# Patient Record
Sex: FEMALE | Race: BLACK OR AFRICAN AMERICAN | Employment: FULL TIME | URBAN - METROPOLITAN AREA
[De-identification: names, ages, dates, MRNs, and addresses within clinical notes are randomized per-mention and may not be internally consistent; named-entity substitution may affect disease eponyms.]

---

## 2022-05-06 LAB
CHOLESTEROL, TOTAL: 162 MG/DL
CHOLESTEROL/HDL RATIO: 2.89
HDLC SERPL-MCNC: 56 MG/DL (ref 35–70)
LDL CHOLESTEROL CALCULATED: 90 MG/DL (ref 0–160)
NONHDLC SERPL-MCNC: 106 MG/DL
TRIGL SERPL-MCNC: 81 MG/DL
VLDLC SERPL CALC-MCNC: 16 MG/DL

## 2022-08-02 LAB — T4 FREE: 8.2

## 2022-08-03 LAB — TSH SERPL DL<=0.05 MIU/L-ACNC: 1.13 UIU/ML

## 2022-11-22 LAB
ALBUMIN SERPL-MCNC: 4.2 G/DL
ALP BLD-CCNC: 92 U/L
ALT SERPL-CCNC: 23 U/L
ANION GAP SERPL CALCULATED.3IONS-SCNC: 1.8 MMOL/L
AST SERPL-CCNC: 26 U/L
BASOPHILS ABSOLUTE: 0 /ΜL
BASOPHILS RELATIVE PERCENT: 0.6 %
BILIRUB SERPL-MCNC: 0.2 MG/DL (ref 0.1–1.4)
BUN BLDV-MCNC: 10 MG/DL
CALCIUM SERPL-MCNC: 9.6 MG/DL
CHLORIDE BLD-SCNC: 105 MMOL/L
CHOLESTEROL, TOTAL: 161 MG/DL
CHOLESTEROL/HDL RATIO: 2.87
CO2: 26 MMOL/L
CREAT SERPL-MCNC: 0.9 MG/DL
EGFR: 75
EOSINOPHILS ABSOLUTE: 0.3 /ΜL
EOSINOPHILS RELATIVE PERCENT: 3.7 %
GLUCOSE BLD-MCNC: 217 MG/DL
HCT VFR BLD CALC: 41 % (ref 36–46)
HDLC SERPL-MCNC: 56 MG/DL (ref 35–70)
HEMOGLOBIN: 13.6 G/DL (ref 12–16)
LDL CHOLESTEROL CALCULATED: 84 MG/DL (ref 0–160)
LYMPHOCYTES ABSOLUTE: 2.6 /ΜL
LYMPHOCYTES RELATIVE PERCENT: 36.3 %
MCH RBC QN AUTO: 29.2 PG
MCHC RBC AUTO-ENTMCNC: 33.2 G/DL
MCV RBC AUTO: 88.2 FL
MONOCYTES ABSOLUTE: 0.5 /ΜL
MONOCYTES RELATIVE PERCENT: 7.2 %
NEUTROPHILS ABSOLUTE: 3.7 /ΜL
NEUTROPHILS RELATIVE PERCENT: 52.1 %
NONHDLC SERPL-MCNC: 15 MG/DL
PLATELET # BLD: 228 K/ΜL
PMV BLD AUTO: 12.7 FL
POTASSIUM SERPL-SCNC: 4.6 MMOL/L
RBC # BLD: 4.65 10^6/ΜL
SODIUM BLD-SCNC: 145 MMOL/L
TOTAL PROTEIN: 6.6
TRIGL SERPL-MCNC: 104 MG/DL
VLDLC SERPL CALC-MCNC: 21 MG/DL
WBC # BLD: 7.1 10^3/ML

## 2023-04-27 LAB — MAMMOGRAPHY, EXTERNAL: NORMAL

## 2023-05-10 LAB — PAP SMEAR, EXTERNAL: NEGATIVE

## 2023-06-21 ENCOUNTER — OFFICE VISIT (OUTPATIENT)
Dept: FAMILY MEDICINE CLINIC | Age: 58
End: 2023-06-21
Payer: COMMERCIAL

## 2023-06-21 ENCOUNTER — TELEPHONE (OUTPATIENT)
Dept: FAMILY MEDICINE CLINIC | Age: 58
End: 2023-06-21

## 2023-06-21 VITALS
OXYGEN SATURATION: 98 % | HEIGHT: 66 IN | BODY MASS INDEX: 31.02 KG/M2 | RESPIRATION RATE: 16 BRPM | DIASTOLIC BLOOD PRESSURE: 73 MMHG | HEART RATE: 68 BPM | WEIGHT: 193 LBS | SYSTOLIC BLOOD PRESSURE: 129 MMHG

## 2023-06-21 DIAGNOSIS — E66.9 OBESITY (BMI 30.0-34.9): ICD-10-CM

## 2023-06-21 DIAGNOSIS — I10 ESSENTIAL HYPERTENSION: ICD-10-CM

## 2023-06-21 DIAGNOSIS — E11.9 TYPE 2 DIABETES MELLITUS WITHOUT COMPLICATION, WITHOUT LONG-TERM CURRENT USE OF INSULIN (HCC): Primary | ICD-10-CM

## 2023-06-21 DIAGNOSIS — Z78.9 MEDICATION INTOLERANCE: ICD-10-CM

## 2023-06-21 PROBLEM — E66.811 OBESITY (BMI 30.0-34.9): Status: ACTIVE | Noted: 2023-06-21

## 2023-06-21 LAB — HBA1C MFR BLD: 7.9 %

## 2023-06-21 PROCEDURE — 1036F TOBACCO NON-USER: CPT | Performed by: FAMILY MEDICINE

## 2023-06-21 PROCEDURE — G8427 DOCREV CUR MEDS BY ELIG CLIN: HCPCS | Performed by: FAMILY MEDICINE

## 2023-06-21 PROCEDURE — 3074F SYST BP LT 130 MM HG: CPT | Performed by: FAMILY MEDICINE

## 2023-06-21 PROCEDURE — G8417 CALC BMI ABV UP PARAM F/U: HCPCS | Performed by: FAMILY MEDICINE

## 2023-06-21 PROCEDURE — 3051F HG A1C>EQUAL 7.0%<8.0%: CPT | Performed by: FAMILY MEDICINE

## 2023-06-21 PROCEDURE — 2022F DILAT RTA XM EVC RTNOPTHY: CPT | Performed by: FAMILY MEDICINE

## 2023-06-21 PROCEDURE — 83037 HB GLYCOSYLATED A1C HOME DEV: CPT | Performed by: FAMILY MEDICINE

## 2023-06-21 PROCEDURE — 3078F DIAST BP <80 MM HG: CPT | Performed by: FAMILY MEDICINE

## 2023-06-21 PROCEDURE — 99204 OFFICE O/P NEW MOD 45 MIN: CPT | Performed by: FAMILY MEDICINE

## 2023-06-21 PROCEDURE — 3017F COLORECTAL CA SCREEN DOC REV: CPT | Performed by: FAMILY MEDICINE

## 2023-06-21 RX ORDER — ASPIRIN 81 MG/1
81 TABLET, CHEWABLE ORAL DAILY
Qty: 90 TABLET | Refills: 3 | Status: SHIPPED | OUTPATIENT
Start: 2023-06-21

## 2023-06-21 RX ORDER — IBUPROFEN 800 MG/1
800 TABLET ORAL PRN
COMMUNITY
Start: 2021-07-31

## 2023-06-21 RX ORDER — ERTUGLIFLOZIN 15 MG/1
1 TABLET, FILM COATED ORAL DAILY
COMMUNITY
Start: 2021-01-11 | End: 2023-06-21 | Stop reason: SDUPTHER

## 2023-06-21 RX ORDER — ERTUGLIFLOZIN 15 MG/1
1 TABLET, FILM COATED ORAL DAILY
Qty: 90 TABLET | Refills: 3 | Status: SHIPPED | OUTPATIENT
Start: 2023-06-21

## 2023-06-21 RX ORDER — METFORMIN HYDROCHLORIDE 500 MG/1
500 TABLET, EXTENDED RELEASE ORAL DAILY
COMMUNITY
End: 2023-06-21 | Stop reason: SDUPTHER

## 2023-06-21 RX ORDER — GLIPIZIDE 10 MG/1
10 TABLET, FILM COATED, EXTENDED RELEASE ORAL DAILY
Qty: 90 TABLET | Refills: 0 | Status: SHIPPED | OUTPATIENT
Start: 2023-06-21

## 2023-06-21 RX ORDER — GLIPIZIDE 10 MG/1
1 TABLET, FILM COATED, EXTENDED RELEASE ORAL DAILY
COMMUNITY
Start: 2023-03-20 | End: 2023-06-21 | Stop reason: CLARIF

## 2023-06-21 RX ORDER — METFORMIN HYDROCHLORIDE 500 MG/1
1500 TABLET, EXTENDED RELEASE ORAL DAILY
Qty: 270 TABLET | Refills: 3 | Status: SHIPPED | OUTPATIENT
Start: 2023-06-21

## 2023-06-21 RX ORDER — LOSARTAN POTASSIUM 25 MG/1
25 TABLET ORAL DAILY
COMMUNITY
Start: 2020-01-09

## 2023-06-21 RX ORDER — ATORVASTATIN CALCIUM 40 MG/1
40 TABLET, FILM COATED ORAL DAILY
Qty: 90 TABLET | Refills: 3 | Status: SHIPPED | OUTPATIENT
Start: 2023-06-21

## 2023-06-21 RX ORDER — OMEGA-3S/DHA/EPA/FISH OIL/D3 300MG-1000
400 CAPSULE ORAL DAILY
COMMUNITY
End: 2023-06-21 | Stop reason: ALTCHOICE

## 2023-06-21 SDOH — ECONOMIC STABILITY: FOOD INSECURITY: WITHIN THE PAST 12 MONTHS, THE FOOD YOU BOUGHT JUST DIDN'T LAST AND YOU DIDN'T HAVE MONEY TO GET MORE.: NEVER TRUE

## 2023-06-21 SDOH — ECONOMIC STABILITY: HOUSING INSECURITY
IN THE LAST 12 MONTHS, WAS THERE A TIME WHEN YOU DID NOT HAVE A STEADY PLACE TO SLEEP OR SLEPT IN A SHELTER (INCLUDING NOW)?: NO

## 2023-06-21 SDOH — ECONOMIC STABILITY: INCOME INSECURITY: HOW HARD IS IT FOR YOU TO PAY FOR THE VERY BASICS LIKE FOOD, HOUSING, MEDICAL CARE, AND HEATING?: NOT VERY HARD

## 2023-06-21 SDOH — ECONOMIC STABILITY: FOOD INSECURITY: WITHIN THE PAST 12 MONTHS, YOU WORRIED THAT YOUR FOOD WOULD RUN OUT BEFORE YOU GOT MONEY TO BUY MORE.: NEVER TRUE

## 2023-06-21 ASSESSMENT — PATIENT HEALTH QUESTIONNAIRE - PHQ9
SUM OF ALL RESPONSES TO PHQ9 QUESTIONS 1 & 2: 0
SUM OF ALL RESPONSES TO PHQ QUESTIONS 1-9: 0
2. FEELING DOWN, DEPRESSED OR HOPELESS: 0
SUM OF ALL RESPONSES TO PHQ QUESTIONS 1-9: 0
1. LITTLE INTEREST OR PLEASURE IN DOING THINGS: 0

## 2023-06-21 NOTE — TELEPHONE ENCOUNTER
Please see note below patient never rescheduled her appointment for her colonoscopy. Spoke to Leonor to get her colonoscopy rescheduled from being on hold from the [place] outbreak. She stated she was not ready yet.  She will call us to reschedule

## 2023-06-21 NOTE — TELEPHONE ENCOUNTER
----- Message from Fco Escobedo MD sent at 6/21/2023  1:55 PM EDT -----  Please search [facility] for colonosocpy --was done October 2022 I think

## 2023-06-21 NOTE — PROGRESS NOTES
complication, without long-term current use of insulin (HCC)  POCT glycosylated hemoglobin (Hb A1C)    Ertugliflozin L-PyroglutamicAc (STEGLATRO) 15 MG TABS    metFORMIN (GLUCOPHAGE-XR) 500 MG extended release tablet    glipiZIDE (GLUCOTROL XL) 10 MG extended release tablet    atorvastatin (LIPITOR) 40 MG tablet    aspirin (ASPIRIN CHILDRENS) 81 MG chewable tablet    Dulaglutide 0.75 MG/0.5ML SOPN    MICROALBUMIN / CREATININE URINE RATIO    HM DIABETES FOOT EXAM      2. Essential hypertension  losartan (COZAAR) 25 MG tablet      3. Obesity (BMI 30.0-34.9)        4. Medication intolerance                Plan:      But diabetes not controlled. Start Trulicity. Continue Steglatro, glipizide, metformin. Patient has diabetes but not on statin or aspirin. We will start now. Reduce the metformin from 4 per day to 3 per day. Don't raise the dose    Recheck in 3 months.     Time: 45 minutes      Return in about 3 months (around 9/21/2023) for DM, HTN, Fast.

## 2023-06-22 LAB
CREAT UR-MCNC: 74.9 MG/DL (ref 28–259)
MICROALBUMIN UR DL<=1MG/L-MCNC: <1.2 MG/DL
MICROALBUMIN/CREAT UR: NORMAL MG/G (ref 0–30)

## 2023-06-23 ENCOUNTER — TELEPHONE (OUTPATIENT)
Dept: FAMILY MEDICINE CLINIC | Age: 58
End: 2023-06-23

## 2023-06-23 DIAGNOSIS — E11.9 TYPE 2 DIABETES MELLITUS WITHOUT COMPLICATION, WITHOUT LONG-TERM CURRENT USE OF INSULIN (HCC): ICD-10-CM

## 2023-06-23 RX ORDER — DULAGLUTIDE 4.5 MG/.5ML
4.5 INJECTION, SOLUTION SUBCUTANEOUS WEEKLY
Qty: 4 ADJUSTABLE DOSE PRE-FILLED PEN SYRINGE | Refills: 0 | Status: SHIPPED | OUTPATIENT
Start: 2023-09-15 | End: 2023-09-19

## 2023-06-23 RX ORDER — DULAGLUTIDE 3 MG/.5ML
3 INJECTION, SOLUTION SUBCUTANEOUS WEEKLY
Qty: 4 ADJUSTABLE DOSE PRE-FILLED PEN SYRINGE | Refills: 0 | Status: SHIPPED | OUTPATIENT
Start: 2023-08-15 | End: 2023-08-19

## 2023-06-23 RX ORDER — DULAGLUTIDE 1.5 MG/.5ML
1.5 INJECTION, SOLUTION SUBCUTANEOUS WEEKLY
Qty: 2 ML | Refills: 0 | Status: SHIPPED | OUTPATIENT
Start: 2023-07-17 | End: 2023-08-08

## 2023-06-23 NOTE — TELEPHONE ENCOUNTER
Please clarify:      Monique Saldaña MD  [facility] [address] [facility]  Please check with pharmacy to see if trulicty script is

## 2023-08-01 ENCOUNTER — TELEPHONE (OUTPATIENT)
Dept: FAMILY MEDICINE CLINIC | Age: 58
End: 2023-08-01

## 2023-08-01 DIAGNOSIS — I10 ESSENTIAL HYPERTENSION: ICD-10-CM

## 2023-08-01 RX ORDER — LOSARTAN POTASSIUM 25 MG/1
25 TABLET ORAL DAILY
Qty: 90 TABLET | Refills: 0 | Status: SHIPPED | OUTPATIENT
Start: 2023-08-01

## 2023-09-21 ENCOUNTER — OFFICE VISIT (OUTPATIENT)
Dept: FAMILY MEDICINE CLINIC | Age: 58
End: 2023-09-21
Payer: COMMERCIAL

## 2023-09-21 VITALS
SYSTOLIC BLOOD PRESSURE: 133 MMHG | HEART RATE: 72 BPM | OXYGEN SATURATION: 97 % | WEIGHT: 181.4 LBS | BODY MASS INDEX: 29.28 KG/M2 | DIASTOLIC BLOOD PRESSURE: 87 MMHG

## 2023-09-21 DIAGNOSIS — E11.9 TYPE 2 DIABETES MELLITUS WITHOUT COMPLICATION, WITHOUT LONG-TERM CURRENT USE OF INSULIN (HCC): ICD-10-CM

## 2023-09-21 DIAGNOSIS — Z23 NEEDS FLU SHOT: ICD-10-CM

## 2023-09-21 DIAGNOSIS — I10 ESSENTIAL HYPERTENSION: Primary | ICD-10-CM

## 2023-09-21 DIAGNOSIS — Z28.20 VACCINE REFUSED BY PATIENT: ICD-10-CM

## 2023-09-21 DIAGNOSIS — Z11.59 NEED FOR HEPATITIS C SCREENING TEST: ICD-10-CM

## 2023-09-21 DIAGNOSIS — Z23 NEED FOR HEPATITIS B VACCINATION: ICD-10-CM

## 2023-09-21 DIAGNOSIS — Z23 NEED FOR PNEUMOCOCCAL VACCINE: ICD-10-CM

## 2023-09-21 LAB
CREAT UR-MCNC: 168.2 MG/DL (ref 28–259)
HBA1C MFR BLD: 5.6 %
MICROALBUMIN UR DL<=1MG/L-MCNC: <1.2 MG/DL
MICROALBUMIN/CREAT UR: NORMAL MG/G (ref 0–30)

## 2023-09-21 PROCEDURE — G8427 DOCREV CUR MEDS BY ELIG CLIN: HCPCS | Performed by: FAMILY MEDICINE

## 2023-09-21 PROCEDURE — 3044F HG A1C LEVEL LT 7.0%: CPT | Performed by: FAMILY MEDICINE

## 2023-09-21 PROCEDURE — 3079F DIAST BP 80-89 MM HG: CPT | Performed by: FAMILY MEDICINE

## 2023-09-21 PROCEDURE — 3075F SYST BP GE 130 - 139MM HG: CPT | Performed by: FAMILY MEDICINE

## 2023-09-21 PROCEDURE — 3017F COLORECTAL CA SCREEN DOC REV: CPT | Performed by: FAMILY MEDICINE

## 2023-09-21 PROCEDURE — G8417 CALC BMI ABV UP PARAM F/U: HCPCS | Performed by: FAMILY MEDICINE

## 2023-09-21 PROCEDURE — 90674 CCIIV4 VAC NO PRSV 0.5 ML IM: CPT | Performed by: FAMILY MEDICINE

## 2023-09-21 PROCEDURE — 99214 OFFICE O/P EST MOD 30 MIN: CPT | Performed by: FAMILY MEDICINE

## 2023-09-21 PROCEDURE — 1036F TOBACCO NON-USER: CPT | Performed by: FAMILY MEDICINE

## 2023-09-21 PROCEDURE — 83036 HEMOGLOBIN GLYCOSYLATED A1C: CPT | Performed by: FAMILY MEDICINE

## 2023-09-21 PROCEDURE — 2022F DILAT RTA XM EVC RTNOPTHY: CPT | Performed by: FAMILY MEDICINE

## 2023-09-21 PROCEDURE — 90471 IMMUNIZATION ADMIN: CPT | Performed by: FAMILY MEDICINE

## 2023-09-21 PROCEDURE — 36415 COLL VENOUS BLD VENIPUNCTURE: CPT | Performed by: FAMILY MEDICINE

## 2023-09-21 RX ORDER — IBUPROFEN 200 MG
200 TABLET ORAL EVERY 6 HOURS PRN
Qty: 60 TABLET | Refills: 0 | Status: SHIPPED | OUTPATIENT
Start: 2023-09-21

## 2023-09-21 RX ORDER — DULAGLUTIDE 1.5 MG/.5ML
1.5 INJECTION, SOLUTION SUBCUTANEOUS WEEKLY
Qty: 6 ML | Refills: 1 | Status: SHIPPED | OUTPATIENT
Start: 2023-09-21 | End: 2024-03-07

## 2023-09-21 RX ORDER — LOSARTAN POTASSIUM 25 MG/1
25 TABLET ORAL DAILY
Qty: 90 TABLET | Refills: 3 | Status: SHIPPED | OUTPATIENT
Start: 2023-09-21

## 2023-09-21 NOTE — PROGRESS NOTES
12:00 AM    BUN 10 11/22/2022 12:00 AM    CREATININE 0.9 11/22/2022 12:00 AM    GLUCOSE 217 11/22/2022 12:00 AM    CALCIUM 9.6 11/22/2022 12:00 AM     Lab Results   Component Value Date/Time    CHOL 161 11/22/2022 12:00 AM    TRIG 104 11/22/2022 12:00 AM    HDL 56 11/22/2022 12:00 AM     Hemoglobin A1C   Date Value Ref Range Status   09/21/2023 5.6 % Final   06/21/2023 7.9 % Final          Assessment:       Diagnosis Orders   1. Essential hypertension  Microalbumin / Creatinine Urine Ratio    Lipid Panel    losartan (COZAAR) 25 MG tablet      2. Type 2 diabetes mellitus without complication, without long-term current use of insulin (HCC)  Comprehensive Metabolic Panel    Vitamin B12    Microalbumin / Creatinine Urine Ratio    Lipid Panel    POCT glycosylated hemoglobin (Hb A1C)    dulaglutide (TRULICITY) 1.5 RS/9.0TN SC injection      3. Needs flu shot  Influenza, FLUCELVAX, (age 10 mo+), IM, PF, 0.5 mL      4. Need for pneumococcal vaccine        5. Need for hepatitis B vaccination        6. Need for hepatitis C screening test  Hepatitis C Antibody      7. Vaccine refused by patient                Plan:      Diabetes much improved. A1c is low. Stop the glipizide. We will also keep the Trulicity at the 1.5 mg dose. Patient doing very well. Reviewed vaccines that she is due for she is declining. Hypertension stable. Continue losartan. Check in 6 months. Return in about 25 weeks (around 3/14/2024) for DM, HTN.

## 2023-09-22 LAB
ALBUMIN SERPL-MCNC: 4.2 G/DL (ref 3.4–5)
ALBUMIN/GLOB SERPL: 1.6 {RATIO} (ref 1.1–2.2)
ALP SERPL-CCNC: 120 U/L (ref 40–129)
ALT SERPL-CCNC: 19 U/L (ref 10–40)
ANION GAP SERPL CALCULATED.3IONS-SCNC: 17 MMOL/L (ref 3–16)
AST SERPL-CCNC: 20 U/L (ref 15–37)
BILIRUB SERPL-MCNC: 0.3 MG/DL (ref 0–1)
BUN SERPL-MCNC: 13 MG/DL (ref 7–20)
CALCIUM SERPL-MCNC: 8.9 MG/DL (ref 8.3–10.6)
CHLORIDE SERPL-SCNC: 105 MMOL/L (ref 99–110)
CHOLEST SERPL-MCNC: 108 MG/DL (ref 0–199)
CO2 SERPL-SCNC: 23 MMOL/L (ref 21–32)
CREAT SERPL-MCNC: 0.8 MG/DL (ref 0.6–1.1)
GFR SERPLBLD CREATININE-BSD FMLA CKD-EPI: >60 ML/MIN/{1.73_M2}
GLUCOSE SERPL-MCNC: 85 MG/DL (ref 70–99)
HCV AB SERPL QL IA: NORMAL
HDLC SERPL-MCNC: 52 MG/DL (ref 40–60)
LDLC SERPL CALC-MCNC: 43 MG/DL
POTASSIUM SERPL-SCNC: 3.9 MMOL/L (ref 3.5–5.1)
PROT SERPL-MCNC: 6.8 G/DL (ref 6.4–8.2)
SODIUM SERPL-SCNC: 145 MMOL/L (ref 136–145)
TRIGL SERPL-MCNC: 63 MG/DL (ref 0–150)
VIT B12 SERPL-MCNC: 229 PG/ML (ref 211–911)
VLDLC SERPL CALC-MCNC: 13 MG/DL

## 2023-10-20 DIAGNOSIS — E11.9 TYPE 2 DIABETES MELLITUS WITHOUT COMPLICATION, WITHOUT LONG-TERM CURRENT USE OF INSULIN (HCC): ICD-10-CM

## 2023-10-20 RX ORDER — ERTUGLIFLOZIN 15 MG/1
1 TABLET, FILM COATED ORAL DAILY
Qty: 90 TABLET | Refills: 1 | Status: SHIPPED | OUTPATIENT
Start: 2023-10-20

## 2024-03-14 ENCOUNTER — OFFICE VISIT (OUTPATIENT)
Dept: FAMILY MEDICINE CLINIC | Age: 59
End: 2024-03-14
Payer: COMMERCIAL

## 2024-03-14 VITALS
BODY MASS INDEX: 28.05 KG/M2 | WEIGHT: 173.8 LBS | DIASTOLIC BLOOD PRESSURE: 80 MMHG | SYSTOLIC BLOOD PRESSURE: 130 MMHG | HEART RATE: 78 BPM | OXYGEN SATURATION: 100 %

## 2024-03-14 DIAGNOSIS — E11.9 TYPE 2 DIABETES MELLITUS WITHOUT COMPLICATION, WITHOUT LONG-TERM CURRENT USE OF INSULIN (HCC): Primary | ICD-10-CM

## 2024-03-14 DIAGNOSIS — L28.2 PRURITIC RASH: ICD-10-CM

## 2024-03-14 DIAGNOSIS — Z23 NEED FOR PNEUMOCOCCAL VACCINE: ICD-10-CM

## 2024-03-14 LAB — HBA1C MFR BLD: 8.2 %

## 2024-03-14 PROCEDURE — 3017F COLORECTAL CA SCREEN DOC REV: CPT | Performed by: FAMILY MEDICINE

## 2024-03-14 PROCEDURE — 3075F SYST BP GE 130 - 139MM HG: CPT | Performed by: FAMILY MEDICINE

## 2024-03-14 PROCEDURE — G8482 FLU IMMUNIZE ORDER/ADMIN: HCPCS | Performed by: FAMILY MEDICINE

## 2024-03-14 PROCEDURE — 1036F TOBACCO NON-USER: CPT | Performed by: FAMILY MEDICINE

## 2024-03-14 PROCEDURE — 99214 OFFICE O/P EST MOD 30 MIN: CPT | Performed by: FAMILY MEDICINE

## 2024-03-14 PROCEDURE — 3052F HG A1C>EQUAL 8.0%<EQUAL 9.0%: CPT | Performed by: FAMILY MEDICINE

## 2024-03-14 PROCEDURE — G8417 CALC BMI ABV UP PARAM F/U: HCPCS | Performed by: FAMILY MEDICINE

## 2024-03-14 PROCEDURE — 3079F DIAST BP 80-89 MM HG: CPT | Performed by: FAMILY MEDICINE

## 2024-03-14 PROCEDURE — 90471 IMMUNIZATION ADMIN: CPT | Performed by: FAMILY MEDICINE

## 2024-03-14 PROCEDURE — G8427 DOCREV CUR MEDS BY ELIG CLIN: HCPCS | Performed by: FAMILY MEDICINE

## 2024-03-14 PROCEDURE — 83036 HEMOGLOBIN GLYCOSYLATED A1C: CPT | Performed by: FAMILY MEDICINE

## 2024-03-14 PROCEDURE — 90677 PCV20 VACCINE IM: CPT | Performed by: FAMILY MEDICINE

## 2024-03-14 PROCEDURE — 2022F DILAT RTA XM EVC RTNOPTHY: CPT | Performed by: FAMILY MEDICINE

## 2024-03-14 RX ORDER — DULAGLUTIDE 1.5 MG/.5ML
1.5 INJECTION, SOLUTION SUBCUTANEOUS WEEKLY
Qty: 6 ML | Refills: 1 | Status: SHIPPED | OUTPATIENT
Start: 2024-03-14 | End: 2024-08-29

## 2024-03-14 RX ORDER — ASPIRIN 81 MG/1
81 TABLET, CHEWABLE ORAL DAILY
Qty: 90 TABLET | Refills: 3 | Status: SHIPPED | OUTPATIENT
Start: 2024-03-14

## 2024-03-14 RX ORDER — METFORMIN HYDROCHLORIDE 500 MG/1
1500 TABLET, EXTENDED RELEASE ORAL DAILY
Qty: 270 TABLET | Refills: 3 | Status: SHIPPED | OUTPATIENT
Start: 2024-03-14

## 2024-03-14 RX ORDER — TRIAMCINOLONE ACETONIDE 0.25 MG/G
CREAM TOPICAL 2 TIMES DAILY
Qty: 15 G | Refills: 0 | Status: SHIPPED | OUTPATIENT
Start: 2024-03-14

## 2024-03-14 RX ORDER — GLIPIZIDE 10 MG/1
10 TABLET, FILM COATED, EXTENDED RELEASE ORAL
Qty: 90 TABLET | Refills: 0 | Status: SHIPPED | OUTPATIENT
Start: 2024-03-14

## 2024-03-14 RX ORDER — ERTUGLIFLOZIN 15 MG/1
1 TABLET, FILM COATED ORAL DAILY
Qty: 90 TABLET | Refills: 1 | Status: SHIPPED | OUTPATIENT
Start: 2024-03-14

## 2024-03-14 RX ORDER — RALOXIFENE HYDROCHLORIDE 60 MG/1
1 TABLET, FILM COATED ORAL DAILY
COMMUNITY
Start: 2023-09-26

## 2024-03-14 ASSESSMENT — PATIENT HEALTH QUESTIONNAIRE - PHQ9
SUM OF ALL RESPONSES TO PHQ9 QUESTIONS 1 & 2: 0
SUM OF ALL RESPONSES TO PHQ QUESTIONS 1-9: 0
1. LITTLE INTEREST OR PLEASURE IN DOING THINGS: 0
SUM OF ALL RESPONSES TO PHQ QUESTIONS 1-9: 0
2. FEELING DOWN, DEPRESSED OR HOPELESS: 0

## 2024-03-14 NOTE — PROGRESS NOTES
(Patient not taking: Reported on 9/21/2023)      BIOTIN FORTE PO Take by mouth Otc      metFORMIN (GLUCOPHAGE-XR) 500 MG extended release tablet Take 3 tablets by mouth daily 270 tablet 3    atorvastatin (LIPITOR) 40 MG tablet Take 1 tablet by mouth daily 90 tablet 3    aspirin (ASPIRIN CHILDRENS) 81 MG chewable tablet Take 1 tablet by mouth daily 90 tablet 3     No current facility-administered medications on file prior to visit.                   Objective:       ***    Physical Exam  Vitals:    03/14/24 1123   Pulse: 78   SpO2: 100%   Weight: 78.8 kg (173 lb 12.8 oz)     Body mass index is 28.05 kg/m².     Wt Readings from Last 3 Encounters:   03/14/24 78.8 kg (173 lb 12.8 oz)   09/21/23 82.3 kg (181 lb 6.4 oz)   06/21/23 87.5 kg (193 lb)     BP Readings from Last 3 Encounters:   09/21/23 133/87   06/21/23 129/73   05/08/17 (!) 143/88          No results found for this visit on 03/14/24.        Assessment:       Diagnosis Orders   1. Type 2 diabetes mellitus without complication, without long-term current use of insulin (HCC)  POCT glycosylated hemoglobin (Hb A1C)              Plan:      ***    No follow-ups on file.       
    Hemoglobin A1C   Date Value Ref Range Status   03/14/2024 8.2 % Final   09/21/2023 5.6 % Final   06/21/2023 7.9 % Final          Assessment:       Diagnosis Orders   1. Type 2 diabetes mellitus without complication, without long-term current use of insulin (HCC)  POCT glycosylated hemoglobin (Hb A1C)    glipiZIDE (GLUCOTROL XL) 10 MG extended release tablet    Ertugliflozin L-PyroglutamicAc (STEGLATRO) 15 MG TABS    dulaglutide (TRULICITY) 1.5 MG/0.5ML SC injection    aspirin (ASPIRIN CHILDRENS) 81 MG chewable tablet    metFORMIN (GLUCOPHAGE-XR) 500 MG extended release tablet      2. Need for pneumococcal vaccine  Pneumococcal, PCV20, PREVNAR 20, (age 6w+), IM, PF      3. Pruritic rash  triamcinolone (KENALOG) 0.025 % cream              Plan:      See orders  DM worsening.      Re-start glipizide.  continue steglatro, metformin, trulicity      Return in about 3 months (around 6/12/2024) for DM, HTN.

## 2024-03-14 NOTE — PATIENT INSTRUCTIONS
NEW OFFICE HOURS: M-TH 7AM-5PM      BRING YOUR MEDICATION BOTTLES TO ALL APPOINTMENTS    Check MY CHART for test results.  If you have forgotten your password, call [phone].    The diagnoses and medications listed in this after visit summary may not be up to date.  Check MY CHART in 28-48 hours for corrections.      Late cancellation policy: So that we can better accommodate people who are sick, please give our office 24 hour notice for an appointment cancellation.      Missed appointments: Your care is very important to us.  It is important that you keep your scheduled appointments.   Multiple missed appointments will lead to a dismissal from the office.      Patients arriving late will be worked into the schedule as time permits, with patients arriving on time taken as scheduled. Late arriving patients are more than welcome to wait or reschedule their appointments.    Please allow 5-7 business days for paperwork to be processed.

## 2024-04-01 ENCOUNTER — TELEPHONE (OUTPATIENT)
Dept: FAMILY MEDICINE CLINIC | Age: 59
End: 2024-04-01

## 2024-04-01 NOTE — TELEPHONE ENCOUNTER
Patient is transferring script to [facility] pharmacy. They have the Trulicity 1.5mg

## 2024-05-03 LAB — DIABETIC RETINOPATHY: NEGATIVE

## 2024-05-19 DIAGNOSIS — E11.9 TYPE 2 DIABETES MELLITUS WITHOUT COMPLICATION, WITHOUT LONG-TERM CURRENT USE OF INSULIN (HCC): ICD-10-CM

## 2024-05-20 RX ORDER — DULAGLUTIDE 1.5 MG/.5ML
INJECTION, SOLUTION SUBCUTANEOUS
Refills: 0 | OUTPATIENT
Start: 2024-05-20

## 2024-06-12 ENCOUNTER — OFFICE VISIT (OUTPATIENT)
Dept: FAMILY MEDICINE CLINIC | Age: 59
End: 2024-06-12
Payer: COMMERCIAL

## 2024-06-12 ENCOUNTER — TELEPHONE (OUTPATIENT)
Dept: FAMILY MEDICINE CLINIC | Age: 59
End: 2024-06-12

## 2024-06-12 VITALS
DIASTOLIC BLOOD PRESSURE: 72 MMHG | SYSTOLIC BLOOD PRESSURE: 118 MMHG | HEART RATE: 70 BPM | OXYGEN SATURATION: 98 % | WEIGHT: 181 LBS | BODY MASS INDEX: 29.21 KG/M2

## 2024-06-12 DIAGNOSIS — L98.9 FOOT LESION: ICD-10-CM

## 2024-06-12 DIAGNOSIS — Z23 NEED FOR TETANUS BOOSTER: ICD-10-CM

## 2024-06-12 DIAGNOSIS — E11.9 TYPE 2 DIABETES MELLITUS WITHOUT COMPLICATION, WITHOUT LONG-TERM CURRENT USE OF INSULIN (HCC): Primary | ICD-10-CM

## 2024-06-12 DIAGNOSIS — I10 ESSENTIAL HYPERTENSION: ICD-10-CM

## 2024-06-12 LAB — HBA1C MFR BLD: 7.5 %

## 2024-06-12 PROCEDURE — 3051F HG A1C>EQUAL 7.0%<8.0%: CPT | Performed by: FAMILY MEDICINE

## 2024-06-12 PROCEDURE — 90471 IMMUNIZATION ADMIN: CPT | Performed by: FAMILY MEDICINE

## 2024-06-12 PROCEDURE — 3078F DIAST BP <80 MM HG: CPT | Performed by: FAMILY MEDICINE

## 2024-06-12 PROCEDURE — 83036 HEMOGLOBIN GLYCOSYLATED A1C: CPT | Performed by: FAMILY MEDICINE

## 2024-06-12 PROCEDURE — G8417 CALC BMI ABV UP PARAM F/U: HCPCS | Performed by: FAMILY MEDICINE

## 2024-06-12 PROCEDURE — 1036F TOBACCO NON-USER: CPT | Performed by: FAMILY MEDICINE

## 2024-06-12 PROCEDURE — G8427 DOCREV CUR MEDS BY ELIG CLIN: HCPCS | Performed by: FAMILY MEDICINE

## 2024-06-12 PROCEDURE — 99214 OFFICE O/P EST MOD 30 MIN: CPT | Performed by: FAMILY MEDICINE

## 2024-06-12 PROCEDURE — 3017F COLORECTAL CA SCREEN DOC REV: CPT | Performed by: FAMILY MEDICINE

## 2024-06-12 PROCEDURE — 90715 TDAP VACCINE 7 YRS/> IM: CPT | Performed by: FAMILY MEDICINE

## 2024-06-12 PROCEDURE — 3074F SYST BP LT 130 MM HG: CPT | Performed by: FAMILY MEDICINE

## 2024-06-12 PROCEDURE — 2022F DILAT RTA XM EVC RTNOPTHY: CPT | Performed by: FAMILY MEDICINE

## 2024-06-12 RX ORDER — DULAGLUTIDE 3 MG/.5ML
3 INJECTION, SOLUTION SUBCUTANEOUS WEEKLY
Qty: 12 ADJUSTABLE DOSE PRE-FILLED PEN SYRINGE | Refills: 3 | Status: SHIPPED | OUTPATIENT
Start: 2024-06-12 | End: 2024-06-12 | Stop reason: SDUPTHER

## 2024-06-12 RX ORDER — DULAGLUTIDE 3 MG/.5ML
3 INJECTION, SOLUTION SUBCUTANEOUS WEEKLY
Qty: 12 ADJUSTABLE DOSE PRE-FILLED PEN SYRINGE | Refills: 3 | Status: SHIPPED | OUTPATIENT
Start: 2024-06-12 | End: 2024-08-29

## 2024-06-12 RX ORDER — BLOOD SUGAR DIAGNOSTIC
1 STRIP MISCELLANEOUS 2 TIMES DAILY
Qty: 100 EACH | Refills: 11 | Status: SHIPPED | OUTPATIENT
Start: 2024-06-12

## 2024-06-12 RX ORDER — GLIPIZIDE 10 MG/1
10 TABLET, FILM COATED, EXTENDED RELEASE ORAL
Qty: 90 TABLET | Refills: 3 | Status: SHIPPED | OUTPATIENT
Start: 2024-06-12

## 2024-06-12 RX ORDER — GLUCOSAMINE HCL/CHONDROITIN SU 500-400 MG
1 CAPSULE ORAL 3 TIMES DAILY
Qty: 100 STRIP | Refills: 11 | Status: SHIPPED | OUTPATIENT
Start: 2024-06-12 | End: 2025-07-17

## 2024-06-12 RX ORDER — LANCETS 30 GAUGE
1 EACH MISCELLANEOUS 2 TIMES DAILY
Qty: 180 EACH | Refills: 3 | Status: SHIPPED | OUTPATIENT
Start: 2024-06-12 | End: 2024-09-10

## 2024-06-12 RX ORDER — DULAGLUTIDE 1.5 MG/.5ML
1.5 INJECTION, SOLUTION SUBCUTANEOUS WEEKLY
Qty: 6 ML | Refills: 1 | Status: SHIPPED | OUTPATIENT
Start: 2024-06-12 | End: 2024-06-12 | Stop reason: DRUGHIGH

## 2024-06-12 RX ORDER — BLOOD-GLUCOSE METER
1 KIT MISCELLANEOUS 2 TIMES DAILY
Qty: 1 EACH | Refills: 0 | Status: SHIPPED | OUTPATIENT
Start: 2024-06-12

## 2024-06-12 RX ORDER — FEXOFENADINE HCL 180 MG/1
180 TABLET ORAL DAILY
COMMUNITY

## 2024-06-12 RX ORDER — ATORVASTATIN CALCIUM 40 MG/1
40 TABLET, FILM COATED ORAL DAILY
Qty: 90 TABLET | Refills: 3 | Status: SHIPPED | OUTPATIENT
Start: 2024-06-12

## 2024-06-12 NOTE — PATIENT INSTRUCTIONS
NEW OFFICE HOURS: M-TH 7AM-5PM  BRING YOUR MEDICATION BOTTLES TO ALL APPOINTMENTS    Check MY CHART for test results.  If you have forgotten your password, call [phone].  The diagnoses and medications listed in this after visit summary may not be up to date.  Check MY CHART in 28-48 hours for corrections.      Late cancellation policy: So that we can better accommodate people who are sick, please give our office 24 hour notice for an appointment cancellation.    Missed appointments: Your care is very important to us.  It is important that you keep your scheduled appointments.   Multiple missed appointments will lead to a dismissal from the office.    Patients arriving late will be worked into the schedule as time permits, with patients arriving on time taken as scheduled. Late arriving patients are more than welcome to wait or reschedule their appointments.    Please allow 5-7 business days for paperwork to be processed.

## 2024-06-12 NOTE — TELEPHONE ENCOUNTER
----- Message from Nicole Willis MD sent at 6/12/2024  1:01 PM EDT -----  Please cancel the 1.5 mg Trulicity at [facility]

## 2024-06-12 NOTE — PROGRESS NOTES
Patient ID: Leonor Salvador 1965    .  Chief Complaint   Patient presents with    Diabetes     Needs meter    Hypertension    Foot Pain     Has a bump on the bottom of the right foot, a little painful, hurts to walk sometimes, noticed the bump yesterday          Diabetes  She presents for her follow-up diabetic visit. She has type 2 diabetes mellitus. There are no hypoglycemic associated symptoms. Risk factors for coronary artery disease include obesity, diabetes mellitus and post-menopausal. Current diabetic treatment includes oral agent (triple therapy). Blood glucose monitoring compliance is good. Her overall blood glucose range is 130-140 mg/dl.   Hypertension  This is a chronic problem. The current episode started more than 1 year ago. Risk factors for coronary artery disease include post-menopausal state and diabetes mellitus. Past treatments include angiotensin blockers.     Skin lesions:  bottom of right foot.  Started this week.  New one came up last night.  No lesions on her palms or other foot    Patient Active Problem List   Diagnosis    Type 2 diabetes mellitus without complication, without long-term current use of insulin ([facility])    Essential hypertension    Obesity (BMI 30.0-34.9)       Past Surgical History:   Procedure Laterality Date    BACK SURGERY      Dr. Hawkins    BREAST BIOPSY Left     x 2    KNEE CARTILAGE SURGERY Right     x 2    TONSILLECTOMY         Family History   Problem Relation Age of Onset    Diabetes Mother     Heart Failure Mother     Dementia Father     Diabetes Father     High Cholesterol Father     Diabetes Sister     Cancer Brother         Bladder    Diabetes Brother        Current Outpatient Medications on File Prior to Visit   Medication Sig Dispense Refill    fexofenadine (ALLEGRA ALLERGY) 180 MG tablet Take 1 tablet by mouth daily      raloxifene (EVISTA) 60 MG tablet Take 1 tablet by mouth daily      Ertugliflozin L-PyroglutamicAc (STEGLATRO) 15 MG TABS Take 1 tablet by

## 2024-07-03 DIAGNOSIS — E11.9 TYPE 2 DIABETES MELLITUS WITHOUT COMPLICATION, WITHOUT LONG-TERM CURRENT USE OF INSULIN (HCC): ICD-10-CM

## 2024-07-05 RX ORDER — ASPIRIN 81 MG/1
81 TABLET, CHEWABLE ORAL DAILY
Qty: 90 TABLET | Refills: 3 | OUTPATIENT
Start: 2024-07-05

## 2024-09-12 ENCOUNTER — OFFICE VISIT (OUTPATIENT)
Dept: FAMILY MEDICINE CLINIC | Age: 59
End: 2024-09-12

## 2024-09-12 VITALS
BODY MASS INDEX: 29.96 KG/M2 | WEIGHT: 179.8 LBS | HEART RATE: 79 BPM | HEIGHT: 65 IN | SYSTOLIC BLOOD PRESSURE: 114 MMHG | DIASTOLIC BLOOD PRESSURE: 70 MMHG | OXYGEN SATURATION: 100 %

## 2024-09-12 DIAGNOSIS — E11.9 TYPE 2 DIABETES MELLITUS WITHOUT COMPLICATION, WITHOUT LONG-TERM CURRENT USE OF INSULIN (HCC): Primary | ICD-10-CM

## 2024-09-12 DIAGNOSIS — Z23 NEED FOR SHINGLES VACCINE: ICD-10-CM

## 2024-09-12 DIAGNOSIS — Z23 NEEDS FLU SHOT: ICD-10-CM

## 2024-09-12 DIAGNOSIS — I10 ESSENTIAL HYPERTENSION: ICD-10-CM

## 2024-09-12 LAB — HBA1C MFR BLD: 6.6 %

## 2024-09-12 RX ORDER — DULAGLUTIDE 3 MG/.5ML
3 INJECTION, SOLUTION SUBCUTANEOUS WEEKLY
Qty: 12 ADJUSTABLE DOSE PRE-FILLED PEN SYRINGE | Refills: 3 | Status: SHIPPED | OUTPATIENT
Start: 2024-09-12

## 2024-09-12 RX ORDER — ERTUGLIFLOZIN 15 MG/1
1 TABLET, FILM COATED ORAL DAILY
Qty: 90 TABLET | Refills: 1 | Status: SHIPPED | OUTPATIENT
Start: 2024-09-12

## 2024-09-12 RX ORDER — LOSARTAN POTASSIUM 25 MG/1
25 TABLET ORAL DAILY
Qty: 90 TABLET | Refills: 3 | Status: SHIPPED | OUTPATIENT
Start: 2024-09-12

## 2024-09-12 RX ORDER — DULAGLUTIDE 3 MG/.5ML
3 INJECTION, SOLUTION SUBCUTANEOUS WEEKLY
Qty: 12 ADJUSTABLE DOSE PRE-FILLED PEN SYRINGE | Refills: 3 | Status: SHIPPED | OUTPATIENT
Start: 2024-09-12 | End: 2024-09-12 | Stop reason: SDUPTHER

## 2024-09-12 RX ORDER — LANCETS 30 GAUGE
1 EACH MISCELLANEOUS 2 TIMES DAILY
Qty: 180 EACH | Refills: 3 | Status: SHIPPED | OUTPATIENT
Start: 2024-09-12

## 2024-09-12 SDOH — ECONOMIC STABILITY: INCOME INSECURITY: HOW HARD IS IT FOR YOU TO PAY FOR THE VERY BASICS LIKE FOOD, HOUSING, MEDICAL CARE, AND HEATING?: NOT VERY HARD

## 2024-09-12 SDOH — ECONOMIC STABILITY: FOOD INSECURITY: WITHIN THE PAST 12 MONTHS, THE FOOD YOU BOUGHT JUST DIDN'T LAST AND YOU DIDN'T HAVE MONEY TO GET MORE.: NEVER TRUE

## 2024-09-12 SDOH — ECONOMIC STABILITY: FOOD INSECURITY: WITHIN THE PAST 12 MONTHS, YOU WORRIED THAT YOUR FOOD WOULD RUN OUT BEFORE YOU GOT MONEY TO BUY MORE.: NEVER TRUE

## 2024-09-13 LAB
ALBUMIN SERPL-MCNC: 4.1 G/DL (ref 3.4–5)
ALBUMIN/GLOB SERPL: 1.6 {RATIO} (ref 1.1–2.2)
ALP SERPL-CCNC: 83 U/L (ref 40–129)
ALT SERPL-CCNC: 19 U/L (ref 10–40)
ANION GAP SERPL CALCULATED.3IONS-SCNC: 11 MMOL/L (ref 3–16)
AST SERPL-CCNC: 23 U/L (ref 15–37)
BILIRUB SERPL-MCNC: <0.2 MG/DL (ref 0–1)
BUN SERPL-MCNC: 14 MG/DL (ref 7–20)
CALCIUM SERPL-MCNC: 9.4 MG/DL (ref 8.3–10.6)
CHLORIDE SERPL-SCNC: 107 MMOL/L (ref 99–110)
CHOLEST SERPL-MCNC: 99 MG/DL (ref 0–199)
CO2 SERPL-SCNC: 23 MMOL/L (ref 21–32)
CREAT SERPL-MCNC: 1 MG/DL (ref 0.6–1.1)
CREAT UR-MCNC: 209 MG/DL (ref 28–259)
GFR SERPLBLD CREATININE-BSD FMLA CKD-EPI: 65 ML/MIN/{1.73_M2}
GLUCOSE SERPL-MCNC: 93 MG/DL (ref 70–99)
HDLC SERPL-MCNC: 57 MG/DL (ref 40–60)
LDLC SERPL CALC-MCNC: 30 MG/DL
MICROALBUMIN UR DL<=1MG/L-MCNC: <1.2 MG/DL
MICROALBUMIN/CREAT UR: NORMAL MG/G (ref 0–30)
POTASSIUM SERPL-SCNC: 4.2 MMOL/L (ref 3.5–5.1)
PROT SERPL-MCNC: 6.6 G/DL (ref 6.4–8.2)
SODIUM SERPL-SCNC: 141 MMOL/L (ref 136–145)
TRIGL SERPL-MCNC: 61 MG/DL (ref 0–150)
VLDLC SERPL CALC-MCNC: 12 MG/DL

## 2024-10-07 DIAGNOSIS — E11.9 TYPE 2 DIABETES MELLITUS WITHOUT COMPLICATION, WITHOUT LONG-TERM CURRENT USE OF INSULIN (HCC): ICD-10-CM

## 2024-10-07 RX ORDER — GLIPIZIDE 10 MG/1
10 TABLET, FILM COATED, EXTENDED RELEASE ORAL
Qty: 90 TABLET | Refills: 3 | OUTPATIENT
Start: 2024-10-07

## 2024-10-08 RX ORDER — GLIPIZIDE 10 MG/1
10 TABLET, FILM COATED, EXTENDED RELEASE ORAL
Qty: 90 TABLET | Refills: 3 | OUTPATIENT
Start: 2024-10-08

## 2024-11-05 ENCOUNTER — OFFICE VISIT (OUTPATIENT)
Dept: FAMILY MEDICINE CLINIC | Age: 59
End: 2024-11-05
Payer: COMMERCIAL

## 2024-11-05 VITALS
WEIGHT: 181 LBS | HEART RATE: 85 BPM | HEIGHT: 65 IN | SYSTOLIC BLOOD PRESSURE: 132 MMHG | BODY MASS INDEX: 30.16 KG/M2 | OXYGEN SATURATION: 98 % | DIASTOLIC BLOOD PRESSURE: 80 MMHG

## 2024-11-05 DIAGNOSIS — Z23 NEED FOR SHINGLES VACCINE: ICD-10-CM

## 2024-11-05 DIAGNOSIS — Z23 NEED FOR COVID-19 VACCINE: ICD-10-CM

## 2024-11-05 DIAGNOSIS — Z00.00 ANNUAL PHYSICAL EXAM: Primary | ICD-10-CM

## 2024-11-05 PROCEDURE — 99396 PREV VISIT EST AGE 40-64: CPT | Performed by: FAMILY MEDICINE

## 2024-11-05 PROCEDURE — G8484 FLU IMMUNIZE NO ADMIN: HCPCS | Performed by: FAMILY MEDICINE

## 2024-11-05 PROCEDURE — 3075F SYST BP GE 130 - 139MM HG: CPT | Performed by: FAMILY MEDICINE

## 2024-11-05 PROCEDURE — 3079F DIAST BP 80-89 MM HG: CPT | Performed by: FAMILY MEDICINE

## 2024-11-05 NOTE — PROGRESS NOTES
History and Physical      Leonor Salvador  YOB: 1965    Date of Service:  11/5/2024    Chief Complaint:   Leonor Salvador is a 59 y.o. female who presents for complete physical examination.    Chief Complaint   Patient presents with    Annual Exam     Physical exam no concerns          HPI: Here for a physical    Hx abnormal PAP: no  How many times in the last 12 months have you had 4 or more alcoholic drinks? 0  Tobacco use: No  Exercise: occasional  Seatbelt use:Yes  Sees dentist every 6 months: yes  How many time per day do you  brush your teeth?  3  Intimate partner violence? No  Substance abuse? no    Wt Readings from Last 3 Encounters:   11/05/24 82.1 kg (181 lb)   09/12/24 81.6 kg (179 lb 12.8 oz)   06/12/24 82.1 kg (181 lb)     BP Readings from Last 3 Encounters:   11/05/24 132/80   09/12/24 114/70   06/12/24 118/72       Patient Active Problem List   Diagnosis    Type 2 diabetes mellitus without complication, without long-term current use of insulin (HCC)    Essential hypertension    Obesity (BMI 30.0-34.9)       Preventive Care:  Health Maintenance   Topic Date Due    HIV screen  Never done    Hepatitis B vaccine (1 of 3 - 19+ 3-dose series) Never done    Shingles vaccine (1 of 2) Never done    COVID-19 Vaccine (3 - 2023-24 season) 09/01/2024    Depression Screen  03/14/2025    Breast cancer screen  05/18/2025    Diabetic foot exam  09/12/2025    A1C test (Diabetic or Prediabetic)  09/12/2025    Diabetic Alb to Cr ratio (uACR) test  09/12/2025    Lipids  09/12/2025    GFR test (Diabetes, CKD 3-4, OR last GFR 15-59)  09/12/2025    Diabetic retinal exam  05/03/2026    Cervical cancer screen  05/10/2026    Colorectal Cancer Screen  08/08/2032    DTaP/Tdap/Td vaccine (2 - Td or Tdap) 06/12/2034    Flu vaccine  Completed    Pneumococcal 0-64 years Vaccine  Completed    Hepatitis C screen  Completed    Hepatitis A vaccine  Aged Out    Hib vaccine  Aged Out    Polio vaccine  Aged Out

## 2025-02-17 DIAGNOSIS — E11.9 TYPE 2 DIABETES MELLITUS WITHOUT COMPLICATION, WITHOUT LONG-TERM CURRENT USE OF INSULIN (HCC): ICD-10-CM

## 2025-02-17 RX ORDER — ATORVASTATIN CALCIUM 40 MG/1
40 TABLET, FILM COATED ORAL DAILY
Qty: 90 TABLET | Refills: 3 | OUTPATIENT
Start: 2025-02-17

## 2025-03-09 SDOH — ECONOMIC STABILITY: INCOME INSECURITY: IN THE LAST 12 MONTHS, WAS THERE A TIME WHEN YOU WERE NOT ABLE TO PAY THE MORTGAGE OR RENT ON TIME?: NO

## 2025-03-09 SDOH — ECONOMIC STABILITY: FOOD INSECURITY: WITHIN THE PAST 12 MONTHS, THE FOOD YOU BOUGHT JUST DIDN'T LAST AND YOU DIDN'T HAVE MONEY TO GET MORE.: NEVER TRUE

## 2025-03-09 SDOH — ECONOMIC STABILITY: FOOD INSECURITY: WITHIN THE PAST 12 MONTHS, YOU WORRIED THAT YOUR FOOD WOULD RUN OUT BEFORE YOU GOT MONEY TO BUY MORE.: NEVER TRUE

## 2025-03-09 SDOH — ECONOMIC STABILITY: TRANSPORTATION INSECURITY
IN THE PAST 12 MONTHS, HAS LACK OF TRANSPORTATION KEPT YOU FROM MEETINGS, WORK, OR FROM GETTING THINGS NEEDED FOR DAILY LIVING?: NO

## 2025-03-09 SDOH — ECONOMIC STABILITY: TRANSPORTATION INSECURITY
IN THE PAST 12 MONTHS, HAS THE LACK OF TRANSPORTATION KEPT YOU FROM MEDICAL APPOINTMENTS OR FROM GETTING MEDICATIONS?: NO

## 2025-03-09 ASSESSMENT — PATIENT HEALTH QUESTIONNAIRE - PHQ9
SUM OF ALL RESPONSES TO PHQ QUESTIONS 1-9: 0
2. FEELING DOWN, DEPRESSED OR HOPELESS: NOT AT ALL
SUM OF ALL RESPONSES TO PHQ QUESTIONS 1-9: 0
1. LITTLE INTEREST OR PLEASURE IN DOING THINGS: NOT AT ALL
2. FEELING DOWN, DEPRESSED OR HOPELESS: NOT AT ALL
1. LITTLE INTEREST OR PLEASURE IN DOING THINGS: NOT AT ALL
SUM OF ALL RESPONSES TO PHQ QUESTIONS 1-9: 0
SUM OF ALL RESPONSES TO PHQ QUESTIONS 1-9: 0
SUM OF ALL RESPONSES TO PHQ9 QUESTIONS 1 & 2: 0

## 2025-03-12 ENCOUNTER — OFFICE VISIT (OUTPATIENT)
Dept: FAMILY MEDICINE CLINIC | Age: 60
End: 2025-03-12
Payer: COMMERCIAL

## 2025-03-12 VITALS
SYSTOLIC BLOOD PRESSURE: 124 MMHG | WEIGHT: 180.8 LBS | OXYGEN SATURATION: 98 % | BODY MASS INDEX: 30.49 KG/M2 | DIASTOLIC BLOOD PRESSURE: 82 MMHG | HEART RATE: 65 BPM

## 2025-03-12 DIAGNOSIS — Z23 NEED FOR SHINGLES VACCINE: ICD-10-CM

## 2025-03-12 DIAGNOSIS — E11.9 TYPE 2 DIABETES MELLITUS WITHOUT COMPLICATION, WITHOUT LONG-TERM CURRENT USE OF INSULIN (HCC): Primary | ICD-10-CM

## 2025-03-12 DIAGNOSIS — Z23 NEED FOR COVID-19 VACCINE: ICD-10-CM

## 2025-03-12 LAB — HBA1C MFR BLD: 6 %

## 2025-03-12 PROCEDURE — 2022F DILAT RTA XM EVC RTNOPTHY: CPT | Performed by: FAMILY MEDICINE

## 2025-03-12 PROCEDURE — 83036 HEMOGLOBIN GLYCOSYLATED A1C: CPT | Performed by: FAMILY MEDICINE

## 2025-03-12 PROCEDURE — 3079F DIAST BP 80-89 MM HG: CPT | Performed by: FAMILY MEDICINE

## 2025-03-12 PROCEDURE — 99214 OFFICE O/P EST MOD 30 MIN: CPT | Performed by: FAMILY MEDICINE

## 2025-03-12 PROCEDURE — G8427 DOCREV CUR MEDS BY ELIG CLIN: HCPCS | Performed by: FAMILY MEDICINE

## 2025-03-12 PROCEDURE — 3017F COLORECTAL CA SCREEN DOC REV: CPT | Performed by: FAMILY MEDICINE

## 2025-03-12 PROCEDURE — 3074F SYST BP LT 130 MM HG: CPT | Performed by: FAMILY MEDICINE

## 2025-03-12 PROCEDURE — 3044F HG A1C LEVEL LT 7.0%: CPT | Performed by: FAMILY MEDICINE

## 2025-03-12 RX ORDER — GLIPIZIDE 10 MG/1
10 TABLET, FILM COATED, EXTENDED RELEASE ORAL
Qty: 90 TABLET | Refills: 1 | Status: SHIPPED | OUTPATIENT
Start: 2025-03-12

## 2025-03-12 RX ORDER — ATORVASTATIN CALCIUM 40 MG/1
40 TABLET, FILM COATED ORAL DAILY
Qty: 90 TABLET | Refills: 1 | Status: SHIPPED | OUTPATIENT
Start: 2025-03-12

## 2025-03-12 RX ORDER — METFORMIN HYDROCHLORIDE 500 MG/1
1500 TABLET, EXTENDED RELEASE ORAL DAILY
Qty: 270 TABLET | Refills: 1 | Status: SHIPPED | OUTPATIENT
Start: 2025-03-12

## 2025-03-12 RX ORDER — DAPAGLIFLOZIN 10 MG/1
10 TABLET, FILM COATED ORAL EVERY MORNING
Qty: 90 TABLET | Refills: 1 | Status: SHIPPED | OUTPATIENT
Start: 2025-03-12

## 2025-03-12 RX ORDER — ASPIRIN 81 MG/1
81 TABLET, CHEWABLE ORAL DAILY
Qty: 90 TABLET | Refills: 2 | Status: SHIPPED | OUTPATIENT
Start: 2025-03-12

## 2025-03-12 NOTE — PROGRESS NOTES
Patient ID: Leonor Salvador 1965    .  Chief Complaint   Patient presents with    Diabetes         Diabetes  She presents for her follow-up (stopped trulicity 3 months ago due to formulary coverage being denied.  admits to not calling about this.  took an extra Metformin this weekend and no diarrhea) diabetic visit. She has type 2 diabetes mellitus. There are no hypoglycemic associated symptoms. Risk factors for coronary artery disease include obesity, diabetes mellitus and post-menopausal. Current diabetic treatment includes oral agent (triple therapy). Blood glucose monitoring compliance is good. Her breakfast blood glucose range is generally 130-140 mg/dl.   Hypertension  This is a chronic problem. The current episode started more than 1 year ago. Risk factors for coronary artery disease include post-menopausal state and diabetes mellitus. Past treatments include angiotensin blockers.   Hyperlipidemia  This is a chronic problem. Current antihyperlipidemic treatment includes statins. Risk factors for coronary artery disease include post-menopausal.         Patient Active Problem List   Diagnosis    Type 2 diabetes mellitus without complication, without long-term current use of insulin ([facility])    Essential hypertension    Obesity (BMI 30.0-34.9)         Current Outpatient Medications on File Prior to Visit   Medication Sig Dispense Refill    Lancets MISC 1 each by Does not apply route 2 times daily 180 each 3    losartan (COZAAR) 25 MG tablet Take 1 tablet by mouth daily 90 tablet 3    blood glucose monitor strips 1 strip by Other route 3 times daily for 1200 doses Test 3 times a day & as needed for symptoms of irregular blood glucose. Dispense sufficient amount for indicated testing frequency plus additional to accommodate PRN testing needs. 100 strip 11    Alcohol Swabs (ALCOHOL PADS) 70 % PADS 1 Units by Does not apply route 2 times daily 100 each 11    Blood Glucose Monitoring Suppl (FREESTYLE LITE) KIMO 1

## 2025-03-25 ENCOUNTER — OFFICE VISIT (OUTPATIENT)
Dept: FAMILY MEDICINE CLINIC | Age: 60
End: 2025-03-25
Payer: COMMERCIAL

## 2025-03-25 VITALS
OXYGEN SATURATION: 99 % | SYSTOLIC BLOOD PRESSURE: 130 MMHG | DIASTOLIC BLOOD PRESSURE: 70 MMHG | WEIGHT: 187.2 LBS | HEART RATE: 74 BPM | BODY MASS INDEX: 31.57 KG/M2

## 2025-03-25 DIAGNOSIS — R29.898 WEAKNESS OF RIGHT SHOULDER: ICD-10-CM

## 2025-03-25 DIAGNOSIS — S40.021A TRAUMATIC ECCHYMOSIS OF RIGHT UPPER ARM, INITIAL ENCOUNTER: ICD-10-CM

## 2025-03-25 DIAGNOSIS — S49.91XA INJURY OF RIGHT SHOULDER, INITIAL ENCOUNTER: Primary | ICD-10-CM

## 2025-03-25 DIAGNOSIS — E11.9 TYPE 2 DIABETES MELLITUS WITHOUT COMPLICATION, WITHOUT LONG-TERM CURRENT USE OF INSULIN: ICD-10-CM
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
## 2025-03-25 PROCEDURE — 3075F SYST BP GE 130 - 139MM HG: CPT | Performed by: FAMILY MEDICINE

## 2025-03-25 PROCEDURE — 1036F TOBACCO NON-USER: CPT | Performed by: FAMILY MEDICINE

## 2025-03-25 PROCEDURE — 2022F DILAT RTA XM EVC RTNOPTHY: CPT | Performed by: FAMILY MEDICINE

## 2025-03-25 PROCEDURE — 3078F DIAST BP <80 MM HG: CPT | Performed by: FAMILY MEDICINE

## 2025-03-25 PROCEDURE — G8427 DOCREV CUR MEDS BY ELIG CLIN: HCPCS | Performed by: FAMILY MEDICINE

## 2025-03-25 PROCEDURE — 99213 OFFICE O/P EST LOW 20 MIN: CPT | Performed by: FAMILY MEDICINE

## 2025-03-25 PROCEDURE — G8417 CALC BMI ABV UP PARAM F/U: HCPCS | Performed by: FAMILY MEDICINE

## 2025-03-25 PROCEDURE — 3044F HG A1C LEVEL LT 7.0%: CPT | Performed by: FAMILY MEDICINE

## 2025-03-25 PROCEDURE — 3017F COLORECTAL CA SCREEN DOC REV: CPT | Performed by: FAMILY MEDICINE

## 2025-03-25 NOTE — PROGRESS NOTES
Patient ID: Leonor Salvador 1965    Chief Complaint   Patient presents with    Shoulder Pain     Right shoulder pain from the fall     Fall     Has a fall Sunday night, did not go to the ED        HPI     History of Present Illness  The patient presents for evaluation of right shoulder pain resulting from a fall.    Right Shoulder Pain  - The fall occurred on Sunday when her foot became entangled in a rug, causing her to lose balance and fall onto her right shoulder.  - She reports that she tried to avoid breaking her wrist by taking the brunt of the fall on her shoulder.  - The impact resulted in a bruise on her right shoulder, which she rates as a 7 on the pain scale.  - She does not believe it is fractured.  - Mobility is limited, particularly when attempting to move the arm behind her back.  - Pain management has included ibuprofen and Tylenol, taken once on Sunday evening and again on Monday.    Right Knee Injury  - Additionally, she sustained a minor rug burn on her right knee.    Weakness in Arm  - It took approximately 15 to 20 minutes to regain her footing due to immediate weakness in her arm, a symptom that persists.    Supplemental information: No head trauma or neck injury is reported. 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 She is left-handed but performs most tasks with her right hand.      Patient Active Problem List   Diagnosis    Type 2 diabetes mellitus without complication, without long-term current use of insulin (HCC)    Essential hypertension    Obesity (BMI 30.0-34.9)       Past Surgical History:   Procedure Laterality Date    BACK SURGERY      Dr. Hawkins    BREAST BIOPSY Left     x 2    KNEE CARTILAGE SURGERY Right     x 2    TONSILLECTOMY      TOTAL KNEE ARTHROPLASTY         Family History   Problem Relation Age of Onset    Diabetes Mother     Heart Failure Mother     Dementia Father     Diabetes Father     High Cholesterol Father

## 2025-04-01 ENCOUNTER — HOSPITAL ENCOUNTER (OUTPATIENT)
Dept: MRI IMAGING | Age: 60
Discharge: HOME | End: 2025-04-01
Attending: FAMILY MEDICINE
Payer: COMMERCIAL
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
## 2025-04-01 DIAGNOSIS — R29.898 WEAKNESS OF RIGHT SHOULDER: ICD-10-CM

## 2025-04-01 PROCEDURE — 73221 MRI JOINT UPR EXTREM W/O DYE: CPT

## 2025-04-02 ENCOUNTER — RESULTS FOLLOW-UP (OUTPATIENT)
Dept: FAMILY MEDICINE CLINIC | Age: 60
End: 2025-04-02

## 2025-04-02 DIAGNOSIS — S42.291A HUMERAL HEAD FRACTURE, RIGHT, CLOSED, INITIAL ENCOUNTER: Primary | ICD-10-CM

## 2025-04-02 DIAGNOSIS — S46.011A TRAUMATIC INCOMPLETE TEAR OF RIGHT ROTATOR CUFF, INITIAL ENCOUNTER: ICD-10-CM

## 2025-04-03 ENCOUNTER — OFFICE VISIT (OUTPATIENT)
Dept: ORTHOPEDIC SURGERY | Age: 60
End: 2025-04-03

## 2025-04-03 VITALS — HEIGHT: 64 IN | HEART RATE: 77 BPM | OXYGEN SATURATION: 99 % | BODY MASS INDEX: 32.13 KG/M2

## 2025-04-03 DIAGNOSIS — Z96.651 HISTORY OF TOTAL RIGHT KNEE REPLACEMENT: ICD-10-CM

## 2025-04-03 DIAGNOSIS — S80.01XA CONTUSION OF RIGHT KNEE, INITIAL ENCOUNTER: ICD-10-CM

## 2025-04-03 DIAGNOSIS — S42.294A OTHER CLOSED NONDISPLACED FRACTURE OF PROXIMAL END OF RIGHT HUMERUS, INITIAL ENCOUNTER: Primary | ICD-10-CM

## 2025-04-03 PROBLEM — S42.201A CLOSED FRACTURE OF RIGHT PROXIMAL HUMERUS: Status: ACTIVE | Noted: 2025-04-03

## 2025-04-03 SDOH — HEALTH STABILITY: PHYSICAL HEALTH: ON AVERAGE, HOW MANY MINUTES DO YOU ENGAGE IN EXERCISE AT THIS LEVEL?: 0 MIN

## 2025-04-03 SDOH — HEALTH STABILITY: PHYSICAL HEALTH: ON AVERAGE, HOW MANY DAYS PER WEEK DO YOU ENGAGE IN MODERATE TO STRENUOUS EXERCISE (LIKE A BRISK WALK)?: 0 DAYS

## 2025-04-03 NOTE — PROGRESS NOTES
IMPRESSION:      1.  Comminuted nondisplaced fracture right humeral head.     2.  Small partial tear at the 4 (supraspinatus tendon with adjacent   tendinopathy. No evidence for full-thickness rotator cuff tear      Pt states she fell over her boot. Pt states her pain has decreased since her fall. Pt states she does not wear her sling while at work and does well. Pt states she still cannot fully lift her arm he way she wants too and the pain is uncomfortable. Pt states she is currently taking OTC pain medication to help.

## 2025-04-03 NOTE — PROGRESS NOTES
4/3/2025   Chief Complaint   Patient presents with    Injury     Comminuted nondisplaced fracture right humeral head        History of Present Illness:                             Leonor Salvador is a 59 y.o. female who presents today for evaluation of her right shoulder pain.  She had a mechanical fall falling forcefully onto her right shoulder about 1 week ago.  She was seen by her primary care provider who treated her appropriately with a sling and imaging.  She was then referred here for further evaluation and discussion of treatment options for her fracture that was found.    Patient has noticed gradual improvement in her overall pain level with rest and immobilization in her sling.  She feels that her shoulder is doing reasonably well with no ongoing instability issues or severe problems at the shoulder.      IMPRESSION:      1.  Comminuted nondisplaced fracture right humeral head.     2.  Small partial tear at the 4 (supraspinatus tendon with adjacent   tendinopathy. No evidence for full-thickness rotator cuff tear      Medical History  Patient's medications, allergies, past medical, surgical, social and family histories were reviewed and updated as appropriate.    Past Medical History:   Diagnosis Date    Diabetes mellitus (HCC)     Sciatic pain      Past Surgical History:   Procedure Laterality Date    BACK SURGERY      Dr. Hawkins    BREAST BIOPSY Left     x 2    KNEE CARTILAGE SURGERY Right     x 2    TONSILLECTOMY      TOTAL KNEE ARTHROPLASTY       Family History   Problem Relation Age of Onset    Diabetes Mother     Heart Failure Mother     Dementia Father     Diabetes Father     High Cholesterol Father     Diabetes Sister     Cancer Brother         Bladder    Diabetes Brother      Social History     Socioeconomic History    Marital status: Single   Occupational History    Occupation:  at [employer]   Tobacco Use    Smoking status: Never     Passive exposure: Never    Smokeless

## 2025-04-06 DIAGNOSIS — E11.9 TYPE 2 DIABETES MELLITUS WITHOUT COMPLICATION, WITHOUT LONG-TERM CURRENT USE OF INSULIN (HCC): ICD-10-CM

## 2025-04-07 RX ORDER — ASPIRIN 81 MG/1
81 TABLET, CHEWABLE ORAL
Qty: 90 TABLET | Refills: 2 | OUTPATIENT
Start: 2025-04-07

## 2025-04-07 RX ORDER — METFORMIN HYDROCHLORIDE 500 MG/1
1500 TABLET, EXTENDED RELEASE ORAL DAILY
Qty: 270 TABLET | Refills: 1 | OUTPATIENT
Start: 2025-04-07

## 2025-04-24 ENCOUNTER — OFFICE VISIT (OUTPATIENT)
Dept: ORTHOPEDIC SURGERY | Age: 60
End: 2025-04-24

## 2025-04-24 VITALS — OXYGEN SATURATION: 98 % | HEIGHT: 64 IN | BODY MASS INDEX: 32.13 KG/M2 | HEART RATE: 70 BPM

## 2025-04-24 DIAGNOSIS — S42.294D OTHER CLOSED NONDISPLACED FRACTURE OF PROXIMAL END OF RIGHT HUMERUS WITH ROUTINE HEALING, SUBSEQUENT ENCOUNTER: Primary | ICD-10-CM

## 2025-04-24 PROCEDURE — 99024 POSTOP FOLLOW-UP VISIT: CPT | Performed by: ORTHOPAEDIC SURGERY

## 2025-04-24 NOTE — PATIENT INSTRUCTIONS
Continue weight-bearing as tolerated.  Continue range of motion exercises as instructed.  Ice and elevate as needed.  Tylenol or Motrin for pain.  Follow up in 1 month

## 2025-04-24 NOTE — PROGRESS NOTES
Pt returns to the office for follow up  on closed nondisplaced fracture of proximal end of right humerus.  Pt states she is having very minimal pain. Pt states she has an occasional ache in her shoulder with certain movements but it has made improvements. Pt states the pain in her right shoulder does increase when she does anything overhead. Pt states she has no new concerns at this time.

## 2025-04-24 NOTE — PROGRESS NOTES
4/24/2025   Chief Complaint   Patient presents with    Follow-up    Injury     Communited nondisplaced fracture right humeral head        History of Present Illness:                             Leonor Salvador is a 59 y.o. female who returns today for follow-up of her right proximal humerus fracture.  She has been doing well advancing her light activities and gentle range of motion.  She feels her shoulder is coming along well and denies any setbacks or problems.  She still has some mild aching pain at the healing fracture site of the proximal humerus.    Pt returns to the office for follow up  on closed nondisplaced fracture of proximal end of right humerus.  Pt states she is having very minimal pain. Pt states she has an occasional ache in her shoulder with certain movements but it has made improvements. Pt states the pain in her right shoulder does increase when she does anything overhead. Pt states she has no new concerns at this time.       Medical History  Patient's medications, allergies, past medical, surgical, social and family histories were reviewed and updated as appropriate.      Review of Systems                                            Examination:  General Exam:  Vitals: Pulse 70   Ht 1.626 m (5' 4\")   SpO2 98%   BMI 32.13 kg/m²    Physical Exam     Right upper extremity:  Improved minimal tenderness to palpation at the infection of the proximal humerus.  Normal limit of the shoulder.  Painless active range of motion and she is able to forward elevate up to 120 and abduct past to 100 degrees and internally and externally rotate with minimal discomfort.  Rotator cuff is functioning well.  Sensation motor functions intact throughout.    Diagnostic testing:  X-rays reviewed in office, I independently reviewed the films in the office today:     XR SHOULDER RIGHT (MIN 2 VIEWS)  Result Date: 4/24/2025  3 views of the right shoulder and proximal humerus show evidence of stable alignment of the

## 2025-05-20 DIAGNOSIS — Z09 FRACTURE FOLLOW-UP: Primary | ICD-10-CM

## 2025-05-21 ENCOUNTER — HOSPITAL ENCOUNTER (OUTPATIENT)
Dept: GENERAL RADIOLOGY | Age: 60
Discharge: HOME | End: 2025-05-21
Payer: COMMERCIAL

## 2025-05-21 DIAGNOSIS — Z09 FRACTURE FOLLOW-UP: ICD-10-CM

## 2025-05-21 PROCEDURE — 73030 X-RAY EXAM OF SHOULDER: CPT

## 2025-05-22 ENCOUNTER — OFFICE VISIT (OUTPATIENT)
Dept: ORTHOPEDIC SURGERY | Age: 60
End: 2025-05-22

## 2025-05-22 VITALS
BODY MASS INDEX: 31.92 KG/M2 | HEART RATE: 58 BPM | OXYGEN SATURATION: 99 % | WEIGHT: 187 LBS | HEIGHT: 64 IN | RESPIRATION RATE: 14 BRPM

## 2025-05-22 DIAGNOSIS — S42.294D OTHER CLOSED NONDISPLACED FRACTURE OF PROXIMAL END OF RIGHT HUMERUS WITH ROUTINE HEALING, SUBSEQUENT ENCOUNTER: Primary | ICD-10-CM

## 2025-05-22 PROCEDURE — 99024 POSTOP FOLLOW-UP VISIT: CPT | Performed by: ORTHOPAEDIC SURGERY

## 2025-05-22 NOTE — PROGRESS NOTES
Patient is in the office to discuss closed nondisplaced fracture of proximal end of right humerus. Patient is 8 weeks from from her original injury. Patient states that she is doing much better, but the ROM is still very limited and stiff. Patient pain level in the shoulder is a 5/10.

## 2025-05-22 NOTE — PROGRESS NOTES
5/22/2025   Chief Complaint   Patient presents with    Fracture     closed nondisplaced fracture of proximal end of right humerus         History of Present Illness:                             Leonor Salvador is a 59 y.o. female who returns today for follow-up of her right proximal humerus fracture.  She still has some stiffness and achy pain but overall has been managing well with light activities.  She has had no serious problems or setbacks and has been able to do all of her normal daily activities.  She still has some discomfort with heavy or repetitive activities and some pain at the end range of motion       Patient is in the office to discuss closed nondisplaced fracture of proximal end of right humerus. Patient is 8 weeks from from her original injury. Patient states that she is doing much better, but the ROM is still very limited and stiff. Patient pain level in the shoulder is a 5/10.          Medical History  Patient's medications, allergies, past medical, surgical, social and family histories were reviewed and updated as appropriate.      Review of Systems                                            Examination:  General Exam:  Vitals: Pulse 58   Resp 14   Ht 1.626 m (5' 4\")   Wt 84.8 kg (187 lb)   SpO2 99%   BMI 32.10 kg/m²    Physical Exam     Right upper extremity:  Improved mild tenderness overlying the proximal humerus and shoulder.  Normal alignment of shoulder.  No pain with gentle active range of motion during forward elevation abduction and rotational movements of the shoulder.  Sensation motor functions intact throughout the upper extremity    Diagnostic testing:  X-rays reviewed in office, I independently reviewed the films in the office today:   X-ray images of the right shoulder from the imaging center yesterday were reviewed by myself and discussed with the patient today:     Findings:  3 views right shoulder demonstrate mild hypertrophic change acromioclavicular   joint. Acromial

## 2025-06-18 ENCOUNTER — TELEPHONE (OUTPATIENT)
Dept: FAMILY MEDICINE CLINIC | Age: 60
End: 2025-06-18

## 2025-06-18 NOTE — TELEPHONE ENCOUNTER
Would prefer that the Losartan not be stopped since it's being used for renal protection in a diabetic patient.  If they want to add on Spironolactone, suggest they do so starting on low dose and they monitor her potassium.

## 2025-06-18 NOTE — TELEPHONE ENCOUNTER
Myriam for [facility] called. They saw patient and want stop put patient on spirolactone.  Would like to discuss since patient is on losartan can this be additional to losartan  or d/c losartan and take only spirolactone. Please advise

## 2025-06-18 NOTE — TELEPHONE ENCOUNTER
Called the derm office and relayed message- Myriam was in a room and didn't have time to discuss, but they will call back after and relay what they plan on doing

## 2025-09-03 ENCOUNTER — OFFICE VISIT (OUTPATIENT)
Dept: FAMILY MEDICINE CLINIC | Age: 60
End: 2025-09-03
Payer: COMMERCIAL

## 2025-09-03 VITALS
WEIGHT: 183 LBS | OXYGEN SATURATION: 97 % | HEIGHT: 64 IN | BODY MASS INDEX: 31.24 KG/M2 | HEART RATE: 89 BPM | DIASTOLIC BLOOD PRESSURE: 84 MMHG | SYSTOLIC BLOOD PRESSURE: 112 MMHG

## 2025-09-03 DIAGNOSIS — E11.69 TYPE 2 DIABETES MELLITUS WITH OBESITY (HCC): Primary | ICD-10-CM

## 2025-09-03 DIAGNOSIS — Z12.31 ENCOUNTER FOR SCREENING MAMMOGRAM FOR MALIGNANT NEOPLASM OF BREAST: ICD-10-CM

## 2025-09-03 DIAGNOSIS — E11.9 TYPE 2 DIABETES MELLITUS WITHOUT COMPLICATION, WITHOUT LONG-TERM CURRENT USE OF INSULIN (HCC): ICD-10-CM

## 2025-09-03 DIAGNOSIS — E66.9 TYPE 2 DIABETES MELLITUS WITH OBESITY (HCC): Primary | ICD-10-CM

## 2025-09-03 DIAGNOSIS — I10 ESSENTIAL HYPERTENSION: ICD-10-CM

## 2025-09-03 LAB
ALBUMIN SERPL-MCNC: 4.3 G/DL (ref 3.4–5)
ALBUMIN/GLOB SERPL: 1.7 {RATIO} (ref 1.1–2.2)
ALP SERPL-CCNC: 125 U/L (ref 40–129)
ALT SERPL-CCNC: 22 U/L (ref 10–40)
ANION GAP SERPL CALCULATED.3IONS-SCNC: 12 MMOL/L (ref 3–16)
AST SERPL-CCNC: 19 U/L (ref 15–37)
BILIRUB SERPL-MCNC: 0.4 MG/DL (ref 0–1)
BUN SERPL-MCNC: 12 MG/DL (ref 7–20)
CALCIUM SERPL-MCNC: 9.5 MG/DL (ref 8.3–10.6)
CHLORIDE SERPL-SCNC: 103 MMOL/L (ref 99–110)
CHOLEST SERPL-MCNC: 119 MG/DL (ref 0–199)
CO2 SERPL-SCNC: 26 MMOL/L (ref 21–32)
CREAT SERPL-MCNC: 0.9 MG/DL (ref 0.6–1.1)
CREAT UR-MCNC: 132 MG/DL (ref 28–259)
GFR SERPLBLD CREATININE-BSD FMLA CKD-EPI: 73 ML/MIN/{1.73_M2}
GLUCOSE SERPL-MCNC: 145 MG/DL (ref 70–99)
HBA1C MFR BLD: 8.3 %
HDLC SERPL-MCNC: 54 MG/DL (ref 40–60)
LDLC SERPL CALC-MCNC: 45 MG/DL
MICROALBUMIN UR DL<=1MG/L-MCNC: <1.2 MG/DL
MICROALBUMIN/CREAT UR: NORMAL MG/G (ref 0–30)
POTASSIUM SERPL-SCNC: 4.6 MMOL/L (ref 3.5–5.1)
PROT SERPL-MCNC: 6.8 G/DL (ref 6.4–8.2)
SODIUM SERPL-SCNC: 141 MMOL/L (ref 136–145)
TRIGL SERPL-MCNC: 100 MG/DL (ref 0–150)
VLDLC SERPL CALC-MCNC: 20 MG/DL

## 2025-09-03 PROCEDURE — G8417 CALC BMI ABV UP PARAM F/U: HCPCS | Performed by: FAMILY MEDICINE

## 2025-09-03 PROCEDURE — 83036 HEMOGLOBIN GLYCOSYLATED A1C: CPT | Performed by: FAMILY MEDICINE

## 2025-09-03 PROCEDURE — 3017F COLORECTAL CA SCREEN DOC REV: CPT | Performed by: FAMILY MEDICINE

## 2025-09-03 PROCEDURE — 2022F DILAT RTA XM EVC RTNOPTHY: CPT | Performed by: FAMILY MEDICINE

## 2025-09-03 PROCEDURE — 3079F DIAST BP 80-89 MM HG: CPT | Performed by: FAMILY MEDICINE

## 2025-09-03 PROCEDURE — 99214 OFFICE O/P EST MOD 30 MIN: CPT | Performed by: FAMILY MEDICINE

## 2025-09-03 PROCEDURE — 1036F TOBACCO NON-USER: CPT | Performed by: FAMILY MEDICINE

## 2025-09-03 PROCEDURE — G8427 DOCREV CUR MEDS BY ELIG CLIN: HCPCS | Performed by: FAMILY MEDICINE

## 2025-09-03 PROCEDURE — 3052F HG A1C>EQUAL 8.0%<EQUAL 9.0%: CPT | Performed by: FAMILY MEDICINE

## 2025-09-03 PROCEDURE — 36415 COLL VENOUS BLD VENIPUNCTURE: CPT | Performed by: FAMILY MEDICINE

## 2025-09-03 PROCEDURE — 3074F SYST BP LT 130 MM HG: CPT | Performed by: FAMILY MEDICINE

## 2025-09-03 RX ORDER — GLIPIZIDE 10 MG/1
20 TABLET, FILM COATED, EXTENDED RELEASE ORAL
Qty: 180 TABLET | Refills: 0 | Status: SHIPPED | OUTPATIENT
Start: 2025-09-03

## 2025-09-03 RX ORDER — GLUCOSAMINE HCL/CHONDROITIN SU 500-400 MG
1 CAPSULE ORAL 3 TIMES DAILY
Qty: 100 STRIP | Refills: 11 | Status: SHIPPED | OUTPATIENT
Start: 2025-09-03 | End: 2026-10-08

## 2025-09-03 RX ORDER — ATORVASTATIN CALCIUM 40 MG/1
40 TABLET, FILM COATED ORAL DAILY
Qty: 90 TABLET | Refills: 1 | Status: SHIPPED | OUTPATIENT
Start: 2025-09-03

## 2025-09-03 RX ORDER — METFORMIN HYDROCHLORIDE 500 MG/1
1500 TABLET, EXTENDED RELEASE ORAL DAILY
Qty: 270 TABLET | Refills: 1 | Status: SHIPPED | OUTPATIENT
Start: 2025-09-03

## 2025-09-03 RX ORDER — BLOOD SUGAR DIAGNOSTIC
1 STRIP MISCELLANEOUS 2 TIMES DAILY
Qty: 100 EACH | Refills: 11 | Status: SHIPPED | OUTPATIENT
Start: 2025-09-03

## 2025-09-03 RX ORDER — LOSARTAN POTASSIUM 25 MG/1
25 TABLET ORAL DAILY
Qty: 90 TABLET | Refills: 3 | Status: SHIPPED | OUTPATIENT
Start: 2025-09-03

## 2025-09-03 RX ORDER — ASPIRIN 81 MG/1
81 TABLET, CHEWABLE ORAL DAILY
Qty: 90 TABLET | Refills: 3 | Status: SHIPPED | OUTPATIENT
Start: 2025-09-03

## 2025-09-03 RX ORDER — DAPAGLIFLOZIN 10 MG/1
10 TABLET, FILM COATED ORAL EVERY MORNING
Qty: 90 TABLET | Refills: 1 | Status: SHIPPED | OUTPATIENT
Start: 2025-09-03